# Patient Record
Sex: FEMALE | NOT HISPANIC OR LATINO | Employment: OTHER | URBAN - NONMETROPOLITAN AREA
[De-identification: names, ages, dates, MRNs, and addresses within clinical notes are randomized per-mention and may not be internally consistent; named-entity substitution may affect disease eponyms.]

---

## 2024-01-18 ENCOUNTER — OFFICE VISIT (OUTPATIENT)
Dept: PRIMARY CARE | Facility: CLINIC | Age: 69
End: 2024-01-18
Payer: MEDICARE

## 2024-01-18 ENCOUNTER — TELEPHONE (OUTPATIENT)
Dept: PRIMARY CARE | Facility: CLINIC | Age: 69
End: 2024-01-18

## 2024-01-18 VITALS
HEIGHT: 67 IN | OXYGEN SATURATION: 96 % | WEIGHT: 163.8 LBS | DIASTOLIC BLOOD PRESSURE: 86 MMHG | HEART RATE: 84 BPM | TEMPERATURE: 98.4 F | BODY MASS INDEX: 25.71 KG/M2 | SYSTOLIC BLOOD PRESSURE: 138 MMHG

## 2024-01-18 DIAGNOSIS — E78.2 MIXED HYPERLIPIDEMIA: ICD-10-CM

## 2024-01-18 DIAGNOSIS — Z00.00 ROUTINE GENERAL MEDICAL EXAMINATION AT HEALTH CARE FACILITY: Primary | ICD-10-CM

## 2024-01-18 DIAGNOSIS — K21.9 GASTROESOPHAGEAL REFLUX DISEASE WITHOUT ESOPHAGITIS: ICD-10-CM

## 2024-01-18 DIAGNOSIS — M54.12 CERVICAL RADICULOPATHY AT C5: ICD-10-CM

## 2024-01-18 DIAGNOSIS — R53.82 CHRONIC FATIGUE: ICD-10-CM

## 2024-01-18 PROBLEM — H10.45 CHRONIC ALLERGIC CONJUNCTIVITIS: Status: ACTIVE | Noted: 2024-01-18

## 2024-01-18 PROBLEM — E78.5 HYPERLIPIDEMIA: Status: ACTIVE | Noted: 2024-01-18

## 2024-01-18 PROBLEM — D18.01 HEMANGIOMA OF SKIN AND SUBCUTANEOUS TISSUE: Status: ACTIVE | Noted: 2022-11-16

## 2024-01-18 PROBLEM — K52.9 COLITIS: Status: ACTIVE | Noted: 2024-01-18

## 2024-01-18 PROBLEM — I73.9 INTERMITTENT CLAUDICATION (CMS-HCC): Status: ACTIVE | Noted: 2024-01-18

## 2024-01-18 PROBLEM — J30.1 ALLERGIC RHINITIS DUE TO POLLEN: Status: ACTIVE | Noted: 2024-01-18

## 2024-01-18 PROBLEM — R10.9 ABDOMINAL PAIN: Status: ACTIVE | Noted: 2024-01-18

## 2024-01-18 PROBLEM — N39.3 FEMALE STRESS INCONTINENCE: Status: ACTIVE | Noted: 2024-01-18

## 2024-01-18 PROBLEM — H05.012 ORBITAL CELLULITIS ON LEFT: Status: ACTIVE | Noted: 2024-01-18

## 2024-01-18 PROBLEM — J30.9 ALLERGIC RHINITIS: Status: ACTIVE | Noted: 2024-01-18

## 2024-01-18 PROBLEM — E83.40 DISORDER OF MAGNESIUM METABOLISM: Status: ACTIVE | Noted: 2024-01-18

## 2024-01-18 PROBLEM — J30.81 ALLERGIC RHINITIS DUE TO ANIMAL HAIR AND DANDER: Status: ACTIVE | Noted: 2024-01-18

## 2024-01-18 PROBLEM — R19.7 DIARRHEA: Status: ACTIVE | Noted: 2024-01-18

## 2024-01-18 PROBLEM — N20.0 NEPHROLITH: Status: ACTIVE | Noted: 2024-01-18

## 2024-01-18 PROBLEM — D22.30 ATYPICAL NEVUS OF FACE: Status: ACTIVE | Noted: 2022-11-16

## 2024-01-18 PROBLEM — D48.5 NEOPLASM OF UNCERTAIN BEHAVIOR OF SKIN: Status: ACTIVE | Noted: 2022-11-16

## 2024-01-18 PROBLEM — F41.9 ANXIETY: Status: ACTIVE | Noted: 2024-01-18

## 2024-01-18 PROBLEM — D17.1 LIPOMA OF FLANK: Status: ACTIVE | Noted: 2024-01-18

## 2024-01-18 PROBLEM — F41.1 GENERALIZED ANXIETY DISORDER: Status: ACTIVE | Noted: 2024-01-18

## 2024-01-18 PROBLEM — F17.200 NICOTINE DEPENDENCE: Status: ACTIVE | Noted: 2024-01-18

## 2024-01-18 LAB
ALBUMIN SERPL BCP-MCNC: 4.6 G/DL (ref 3.4–5)
ALP SERPL-CCNC: 73 U/L (ref 33–136)
ALT SERPL W P-5'-P-CCNC: 35 U/L (ref 7–45)
ANION GAP SERPL CALC-SCNC: 15 MMOL/L (ref 10–20)
AST SERPL W P-5'-P-CCNC: 30 U/L (ref 9–39)
BASOPHILS # BLD AUTO: 0.05 X10*3/UL (ref 0–0.1)
BASOPHILS NFR BLD AUTO: 0.5 %
BILIRUB SERPL-MCNC: 0.4 MG/DL (ref 0–1.2)
BUN SERPL-MCNC: 11 MG/DL (ref 6–23)
CALCIUM SERPL-MCNC: 9.6 MG/DL (ref 8.6–10.3)
CHLORIDE SERPL-SCNC: 103 MMOL/L (ref 98–107)
CHOLEST SERPL-MCNC: 172 MG/DL (ref 0–199)
CHOLESTEROL/HDL RATIO: 3.8
CO2 SERPL-SCNC: 27 MMOL/L (ref 21–32)
CREAT SERPL-MCNC: 0.72 MG/DL (ref 0.5–1.05)
EGFRCR SERPLBLD CKD-EPI 2021: >90 ML/MIN/1.73M*2
EOSINOPHIL # BLD AUTO: 0.29 X10*3/UL (ref 0–0.7)
EOSINOPHIL NFR BLD AUTO: 3 %
ERYTHROCYTE [DISTWIDTH] IN BLOOD BY AUTOMATED COUNT: 11.5 % (ref 11.5–14.5)
GLUCOSE SERPL-MCNC: 87 MG/DL (ref 74–99)
HCT VFR BLD AUTO: 48.5 % (ref 36–46)
HDLC SERPL-MCNC: 45.7 MG/DL
HGB BLD-MCNC: 16.6 G/DL (ref 12–16)
IMM GRANULOCYTES # BLD AUTO: 0.07 X10*3/UL (ref 0–0.7)
IMM GRANULOCYTES NFR BLD AUTO: 0.7 % (ref 0–0.9)
LDLC SERPL CALC-MCNC: 101 MG/DL
LYMPHOCYTES # BLD AUTO: 3.09 X10*3/UL (ref 1.2–4.8)
LYMPHOCYTES NFR BLD AUTO: 32.3 %
MCH RBC QN AUTO: 31.3 PG (ref 26–34)
MCHC RBC AUTO-ENTMCNC: 34.2 G/DL (ref 32–36)
MCV RBC AUTO: 92 FL (ref 80–100)
MONOCYTES # BLD AUTO: 0.65 X10*3/UL (ref 0.1–1)
MONOCYTES NFR BLD AUTO: 6.8 %
NEUTROPHILS # BLD AUTO: 5.41 X10*3/UL (ref 1.2–7.7)
NEUTROPHILS NFR BLD AUTO: 56.7 %
NON HDL CHOLESTEROL: 126 MG/DL (ref 0–149)
NRBC BLD-RTO: 0 /100 WBCS (ref 0–0)
PLATELET # BLD AUTO: 233 X10*3/UL (ref 150–450)
POTASSIUM SERPL-SCNC: 4.4 MMOL/L (ref 3.5–5.3)
PROT SERPL-MCNC: 6.8 G/DL (ref 6.4–8.2)
RBC # BLD AUTO: 5.3 X10*6/UL (ref 4–5.2)
SODIUM SERPL-SCNC: 141 MMOL/L (ref 136–145)
T4 FREE SERPL-MCNC: 0.87 NG/DL (ref 0.61–1.12)
TRIGL SERPL-MCNC: 129 MG/DL (ref 0–149)
TSH SERPL-ACNC: 3.11 MIU/L (ref 0.44–3.98)
VLDL: 26 MG/DL (ref 0–40)
WBC # BLD AUTO: 9.6 X10*3/UL (ref 4.4–11.3)

## 2024-01-18 PROCEDURE — 1159F MED LIST DOCD IN RCRD: CPT | Performed by: FAMILY MEDICINE

## 2024-01-18 PROCEDURE — 80053 COMPREHEN METABOLIC PANEL: CPT

## 2024-01-18 PROCEDURE — 1125F AMNT PAIN NOTED PAIN PRSNT: CPT | Performed by: FAMILY MEDICINE

## 2024-01-18 PROCEDURE — 84443 ASSAY THYROID STIM HORMONE: CPT

## 2024-01-18 PROCEDURE — 4004F PT TOBACCO SCREEN RCVD TLK: CPT | Performed by: FAMILY MEDICINE

## 2024-01-18 PROCEDURE — 80061 LIPID PANEL: CPT

## 2024-01-18 PROCEDURE — 84439 ASSAY OF FREE THYROXINE: CPT

## 2024-01-18 PROCEDURE — 36415 COLL VENOUS BLD VENIPUNCTURE: CPT

## 2024-01-18 PROCEDURE — 1170F FXNL STATUS ASSESSED: CPT | Performed by: FAMILY MEDICINE

## 2024-01-18 PROCEDURE — G0439 PPPS, SUBSEQ VISIT: HCPCS | Performed by: FAMILY MEDICINE

## 2024-01-18 PROCEDURE — 85025 COMPLETE CBC W/AUTO DIFF WBC: CPT

## 2024-01-18 RX ORDER — EPINASTINE HYDROCHLORIDE 0.5 MG/ML
SOLUTION/ DROPS OPHTHALMIC EVERY 12 HOURS
COMMUNITY
Start: 2018-08-02

## 2024-01-18 RX ORDER — OLOPATADINE HYDROCHLORIDE 2 MG/ML
SOLUTION/ DROPS OPHTHALMIC EVERY 24 HOURS
COMMUNITY
Start: 2021-07-26

## 2024-01-18 RX ORDER — ROSUVASTATIN CALCIUM 5 MG/1
5 TABLET, COATED ORAL DAILY
Qty: 90 TABLET | Refills: 3 | Status: SHIPPED | OUTPATIENT
Start: 2024-01-18 | End: 2024-04-18 | Stop reason: SDUPTHER

## 2024-01-18 RX ORDER — FLUOROURACIL 50 MG/G
CREAM TOPICAL
COMMUNITY
Start: 2018-05-14

## 2024-01-18 RX ORDER — ROSUVASTATIN CALCIUM 5 MG/1
5 TABLET, COATED ORAL DAILY
COMMUNITY
End: 2024-01-18 | Stop reason: SDUPTHER

## 2024-01-18 RX ORDER — ASPIRIN 81 MG/1
TABLET ORAL
COMMUNITY
Start: 2021-03-24

## 2024-01-18 RX ORDER — DULOXETIN HYDROCHLORIDE 30 MG/1
30 CAPSULE, DELAYED RELEASE ORAL DAILY
COMMUNITY
End: 2024-01-18

## 2024-01-18 RX ORDER — CALCIUM CARBONATE 500(1250)
TABLET ORAL EVERY 12 HOURS
COMMUNITY

## 2024-01-18 RX ORDER — EZETIMIBE 10 MG
10 TABLET ORAL DAILY
Qty: 90 TABLET | Refills: 3 | Status: SHIPPED | OUTPATIENT
Start: 2024-01-18 | End: 2025-01-17

## 2024-01-18 RX ORDER — ERGOCALCIFEROL 1.25 MG/1
CAPSULE ORAL
COMMUNITY

## 2024-01-18 RX ORDER — ALPRAZOLAM 0.5 MG/1
TABLET ORAL
COMMUNITY
Start: 2022-09-01

## 2024-01-18 RX ORDER — ALBUTEROL SULFATE 90 UG/1
AEROSOL, METERED RESPIRATORY (INHALATION) EVERY 6 HOURS
COMMUNITY
Start: 2018-08-02

## 2024-01-18 RX ORDER — DULOXETIN HYDROCHLORIDE 20 MG/1
40 CAPSULE, DELAYED RELEASE ORAL DAILY
Qty: 180 CAPSULE | Refills: 3 | Status: SHIPPED | OUTPATIENT
Start: 2024-01-18 | End: 2025-01-17

## 2024-01-18 RX ORDER — EZETIMIBE 10 MG
TABLET ORAL EVERY 24 HOURS
COMMUNITY
Start: 2017-02-20 | End: 2024-01-18 | Stop reason: SDUPTHER

## 2024-01-18 ASSESSMENT — ENCOUNTER SYMPTOMS
FREQUENCY: 0
DYSURIA: 0
LOSS OF SENSATION IN FEET: 0
NAUSEA: 0
FATIGUE: 0
NECK STIFFNESS: 1
FEVER: 0
BRUISES/BLEEDS EASILY: 0
CHILLS: 0
DIARRHEA: 0
NECK PAIN: 1
VOMITING: 0
HEMATURIA: 0
DIZZINESS: 0
ARTHRALGIAS: 1
NERVOUS/ANXIOUS: 0
COUGH: 0
WHEEZING: 0
HEADACHES: 0
JOINT SWELLING: 0
CONSTIPATION: 0
DYSPHORIC MOOD: 0
OCCASIONAL FEELINGS OF UNSTEADINESS: 0
PALPITATIONS: 0
ABDOMINAL PAIN: 0
SHORTNESS OF BREATH: 0
SORE THROAT: 0
DEPRESSION: 0

## 2024-01-18 ASSESSMENT — PAIN SCALES - GENERAL: PAINLEVEL: 2

## 2024-01-18 ASSESSMENT — ACTIVITIES OF DAILY LIVING (ADL)
MANAGING_FINANCES: INDEPENDENT
DRESSING: INDEPENDENT
GROCERY_SHOPPING: INDEPENDENT
TAKING_MEDICATION: INDEPENDENT
BATHING: INDEPENDENT

## 2024-01-18 ASSESSMENT — PATIENT HEALTH QUESTIONNAIRE - PHQ9
1. LITTLE INTEREST OR PLEASURE IN DOING THINGS: NOT AT ALL
SUM OF ALL RESPONSES TO PHQ9 QUESTIONS 1 AND 2: 0
2. FEELING DOWN, DEPRESSED OR HOPELESS: NOT AT ALL

## 2024-01-18 ASSESSMENT — LIFESTYLE VARIABLES: HOW OFTEN DO YOU HAVE A DRINK CONTAINING ALCOHOL: NEVER

## 2024-01-18 NOTE — PROGRESS NOTES
"Subjective   Reason for Visit: Lisa Tillman is an 68 y.o. female here for a Medicare Wellness visit.     Past Medical, Surgical, and Family History reviewed and updated in chart.    Reviewed all medications by prescribing practitioner or clinical pharmacist (such as prescriptions, OTCs, herbal therapies and supplements) and documented in the medical record.    HPI Here for yearly exam.  Care gaps reviewed.  Lab studies are due.  She complains on ongoing neck pain in her left side of her neck as well as down her left arm.  She does have a diagnosis of cervical radiculopathy and is wondering if she needs surgical intervention.  Her daughter recently had surgical intervention on her neck issues and she would like to see the same physician.    Patient Care Team:  Amanda Paris MD as PCP - General (Family Medicine)  Amanda Paris MD as PCP - [employer] PCP     Review of Systems   Constitutional:  Negative for chills, fatigue and fever.   HENT:  Negative for congestion, ear pain, hearing loss, postnasal drip, sore throat and tinnitus.    Eyes:  Negative for visual disturbance.   Respiratory:  Negative for cough, shortness of breath and wheezing.    Cardiovascular:  Negative for chest pain and palpitations.   Gastrointestinal:  Negative for abdominal pain, constipation, diarrhea, nausea and vomiting.   Endocrine: Negative for polyuria.   Genitourinary:  Negative for dysuria, frequency, hematuria and urgency.   Musculoskeletal:  Positive for arthralgias, neck pain and neck stiffness. Negative for gait problem and joint swelling.   Skin:  Negative for rash.   Neurological:  Negative for dizziness, syncope and headaches.   Hematological:  Does not bruise/bleed easily.   Psychiatric/Behavioral:  Negative for dysphoric mood. The patient is not nervous/anxious.        Objective   Vitals:  /86   Pulse 84   Temp 36.9 °C (98.4 °F)   Ht 1.702 m (5' 7\")   Wt 74.3 kg (163 lb " 12.8 oz)   SpO2 96%   BMI 25.65 kg/m²       Physical Exam  Vitals reviewed.   Constitutional:       General: She is not in acute distress.     Appearance: Normal appearance.   HENT:      Head: Normocephalic.      Right Ear: Tympanic membrane, ear canal and external ear normal.      Left Ear: Tympanic membrane, ear canal and external ear normal.      Nose: Nose normal.      Mouth/Throat:      Mouth: Mucous membranes are moist.      Pharynx: Oropharynx is clear.   Eyes:      Extraocular Movements: Extraocular movements intact.      Conjunctiva/sclera: Conjunctivae normal.      Pupils: Pupils are equal, round, and reactive to light.   Cardiovascular:      Rate and Rhythm: Normal rate and regular rhythm.      Pulses: Normal pulses.      Heart sounds: No murmur heard.  Pulmonary:      Effort: Pulmonary effort is normal.      Breath sounds: Normal breath sounds. No wheezing or rhonchi.   Abdominal:      General: Bowel sounds are normal. There is no distension.      Palpations: Abdomen is soft.      Tenderness: There is no abdominal tenderness. There is no rebound.   Musculoskeletal:         General: Swelling and tenderness present.      Right lower leg: No edema.      Left lower leg: No edema.   Skin:     General: Skin is warm.      Coloration: Skin is not jaundiced.   Neurological:      General: No focal deficit present.      Mental Status: She is alert and oriented to person, place, and time.      Cranial Nerves: No cranial nerve deficit.      Gait: Gait normal.   Psychiatric:         Mood and Affect: Mood normal.         Assessment/Plan   Problem List Items Addressed This Visit       Chronic fatigue    Relevant Medications    DULoxetine (Cymbalta) 20 mg DR capsule    Other Relevant Orders    CBC and Auto Differential    Thyroid Stimulating Hormone    Thyroxine, Free    Gastroesophageal reflux disease without esophagitis    Relevant Orders    CBC and Auto Differential    Hyperlipidemia    Relevant Medications    Zetia  10 mg tablet    rosuvastatin (Crestor) 5 mg tablet    Other Relevant Orders    Comprehensive Metabolic Panel    Lipid Panel     Other Visit Diagnoses       Routine general medical examination at health care facility    -  Primary    Relevant Orders    1 Year Follow Up In Primary Care - Wellness Exam    Cervical radiculopathy at C5        Relevant Orders    Referral to Orthopaedic Surgery          Reviewed low-fat low-cholesterol low-salt diet choices as well as encouraged regular physical activity for 30 minutes 4 times a week.  Mammogram due this spring. Colonoscopy up-to-date.  Immunizations reviewed. Revewed age-appropriate screening recommendations.  Laboratory studies ordered. Declines dexa.     Increase cymbalta to 40 mg a day and refer to ortho for neck

## 2024-02-01 ENCOUNTER — APPOINTMENT (OUTPATIENT)
Dept: ORTHOPEDIC SURGERY | Facility: CLINIC | Age: 69
End: 2024-02-01
Payer: MEDICARE

## 2024-02-06 ENCOUNTER — OFFICE VISIT (OUTPATIENT)
Dept: ORTHOPEDIC SURGERY | Facility: CLINIC | Age: 69
End: 2024-02-06
Payer: MEDICARE

## 2024-02-06 ENCOUNTER — HOSPITAL ENCOUNTER (OUTPATIENT)
Dept: RADIOLOGY | Facility: CLINIC | Age: 69
Discharge: HOME | End: 2024-02-06
Payer: MEDICARE

## 2024-02-06 DIAGNOSIS — M54.12 CERVICAL RADICULOPATHY: ICD-10-CM

## 2024-02-06 DIAGNOSIS — M54.12 CERVICAL RADICULOPATHY AT C5: ICD-10-CM

## 2024-02-06 DIAGNOSIS — M54.12 CERVICAL RADICULOPATHY: Primary | ICD-10-CM

## 2024-02-06 PROCEDURE — 4004F PT TOBACCO SCREEN RCVD TLK: CPT | Performed by: PHYSICIAN ASSISTANT

## 2024-02-06 PROCEDURE — 72050 X-RAY EXAM NECK SPINE 4/5VWS: CPT

## 2024-02-06 PROCEDURE — 1159F MED LIST DOCD IN RCRD: CPT | Performed by: PHYSICIAN ASSISTANT

## 2024-02-06 PROCEDURE — 1125F AMNT PAIN NOTED PAIN PRSNT: CPT | Performed by: PHYSICIAN ASSISTANT

## 2024-02-06 PROCEDURE — 99204 OFFICE O/P NEW MOD 45 MIN: CPT | Performed by: PHYSICIAN ASSISTANT

## 2024-02-06 PROCEDURE — 72050 X-RAY EXAM NECK SPINE 4/5VWS: CPT | Performed by: RADIOLOGY

## 2024-02-06 PROCEDURE — 1160F RVW MEDS BY RX/DR IN RCRD: CPT | Performed by: PHYSICIAN ASSISTANT

## 2024-02-06 NOTE — PROGRESS NOTES
Karissa is a 68-year-old female reporting clinic today for evaluation of her chronic neck pain with bilateral radiculopathy.    She was referred to see us by her daughter Gail Jensen, she recently had surgery with Dr. Coyne.    She has had symptoms for the last 20+ years, she attributes this to seeing a chiropractor around this time. She has midline neck pain with pain radiating down her arms bilaterally, left worse than right.  She has numbness and tingling in her arms bilaterally.  She states her head feels heavy and she spends a lot of time lying down.  She has difficulty sleeping at night, she can only sleep in 2 comfortable positions.  She has noticed a steady decline in her balance over the years.  She cannot drive longer than 45 minutes due to the neck pain.    She has done extensive conservative treatment over the years.  She has tried traction, TENS units, and several rounds of physical therapy.  She has been doing at home exercises consistently for the last 6 months.  She takes Aleve daily.  Conservative treatments have not made a difference in her symptoms.    Family, social, and medical histories are obtained and reviewed.  Current smoker, 1 pack/day.    ROS: All other systems have been reviewed and are negative except as previously noted in history of present illness.    Physical Exam:  Const: Well-appearing, well-nourished female in no distress.  Eyes: Normal appearing sclera and conjunctiva, no jaundice, pupils normal in appearance.  Neck: No masses or lymphadenopathy appreciated.  Resp: breathing comfortably, normal respiratory rate rate.  CV: No upper or lower extremity edema.  Musculoskeletal: Normal gait.  Able to heel/toe walk without difficulty.  Cervical ROM is supple.  Strength exam of the upper extremities reveals 5/5 strength in all major muscle groups.  No intrinsic wasting.  Negative Spurling sign.    Neuro: Sensation is intact and equal bilaterally. Deep tendon reflexes are normal and  symmetric.  No clonus, negative Mcdaniel sign, negative Lhermitte's sign  Skin: Intact without any lesions, normal turgor.  Psych: Alert and oriented x3, normal mood and affect.    I personally reviewed x-rays of the cervical spine completed today.  There is no evidence of acute fracture or instability.  Mild to moderate degenerative changes throughout the cervical spine.    The plan is to further evaluate her chronic neck pain with bilateral radiculopathy that is failed to improve with extensive conservative treatment.  A MRI of the cervical spine was completed today.  I discussed the importance of smoking cessation with her and recommended she reach out to her PCP for supportive measures and alternatives.  She will follow-up with me once her MRI is complete to discuss further treatment options, possibly including surgery.  She is not interested in injections.    **This note was dictated using speech recognition software and was not corrected for spelling or grammatical errors**

## 2024-02-22 ENCOUNTER — HOSPITAL ENCOUNTER (OUTPATIENT)
Dept: RADIOLOGY | Facility: HOSPITAL | Age: 69
Discharge: HOME | End: 2024-02-22
Payer: MEDICARE

## 2024-02-22 DIAGNOSIS — M54.12 CERVICAL RADICULOPATHY: ICD-10-CM

## 2024-02-22 PROCEDURE — 72141 MRI NECK SPINE W/O DYE: CPT

## 2024-02-22 PROCEDURE — 72141 MRI NECK SPINE W/O DYE: CPT | Performed by: RADIOLOGY

## 2024-03-07 ENCOUNTER — APPOINTMENT (OUTPATIENT)
Dept: ORTHOPEDIC SURGERY | Facility: CLINIC | Age: 69
End: 2024-03-07
Payer: MEDICARE

## 2024-03-21 ENCOUNTER — APPOINTMENT (OUTPATIENT)
Dept: ORTHOPEDIC SURGERY | Facility: CLINIC | Age: 69
End: 2024-03-21
Payer: MEDICARE

## 2024-04-04 ENCOUNTER — APPOINTMENT (OUTPATIENT)
Dept: ORTHOPEDIC SURGERY | Facility: CLINIC | Age: 69
End: 2024-04-04
Payer: MEDICARE

## 2024-04-15 ENCOUNTER — OFFICE VISIT (OUTPATIENT)
Dept: ORTHOPEDIC SURGERY | Facility: CLINIC | Age: 69
End: 2024-04-15
Payer: MEDICARE

## 2024-04-15 DIAGNOSIS — M54.12 CERVICAL RADICULITIS: ICD-10-CM

## 2024-04-15 DIAGNOSIS — M54.2 NECK PAIN: Primary | ICD-10-CM

## 2024-04-15 PROCEDURE — 99214 OFFICE O/P EST MOD 30 MIN: CPT | Performed by: ORTHOPAEDIC SURGERY

## 2024-04-15 PROCEDURE — 1160F RVW MEDS BY RX/DR IN RCRD: CPT | Performed by: ORTHOPAEDIC SURGERY

## 2024-04-15 PROCEDURE — 1159F MED LIST DOCD IN RCRD: CPT | Performed by: ORTHOPAEDIC SURGERY

## 2024-04-15 ASSESSMENT — PAIN SCALES - GENERAL: PAINLEVEL_OUTOF10: 0 - NO PAIN

## 2024-04-15 ASSESSMENT — PAIN - FUNCTIONAL ASSESSMENT: PAIN_FUNCTIONAL_ASSESSMENT: 0-10

## 2024-04-15 ASSESSMENT — PAIN DESCRIPTION - DESCRIPTORS: DESCRIPTORS: ACHING;TIRING

## 2024-04-15 NOTE — LETTER
April 15, 2024     Amanda Paris MD  [id number] [address] [address]    Patient: Karissa Tillman   YOB: 1955   Date of Visit: 4/15/2024       Dear Dr. Amanda Paris MD:    Thank you for referring Karissa Tillman to me for evaluation. Below are my notes for this consultation.  If you have questions, please do not hesitate to call me. I look forward to following your patient along with you.       Sincerely,     Pelon Coyne MD      CC: No Recipients  ______________________________________________________________________________________    68-year-old female chief complaint of neck pain.  Her head feels heavy.  She gets some pain in her arms intermittently but the neck pain is much worse.  She denies any bowel or bladder incontinence or other symptoms of myelopathy.  She has had some physical therapy.  She is here today to review an MRI.    She does have a history of smoking.  She quit cigarettes but uses nicotine patches regularly.    She is otherwise in fairly good health.    On exam she is well-appearing and in no distress.  She walks with a normal gait.  No focal neurologic deficits.  No pathologic reflexes or long track signs.    I personally reviewed MRI of her cervical spine.  This shows mild flattening of the spinal cord at C4-5 and C5-6 without any signal change.  There is bilateral foraminal stenosis at both of these levels.    68-year-old female with axial neck pain, minimal radicular symptoms, and nicotine use.  I recommended against surgery at this point as she does not have any moderate or severe myelopathy.  I strongly encouraged nicotine cessation as well as aerobic exercise.    I explained that surgery is intended to improve her radicular pain and if that is not the primary problem I would hold off on any spine surgery.  If she begins to have worsening fine motor disruption or gait instability, or new onset pain or numbness in the arms that  is more severe I would be happy to see her back to discuss surgery.    *This note was dictated using speech recognition software and was not corrected for spelling or grammatical errors*

## 2024-04-15 NOTE — PROGRESS NOTES
68-year-old female chief complaint of neck pain.  Her head feels heavy.  She gets some pain in her arms intermittently but the neck pain is much worse.  She denies any bowel or bladder incontinence or other symptoms of myelopathy.  She has had some physical therapy.  She is here today to review an MRI.    She does have a history of smoking.  She quit cigarettes but uses nicotine patches regularly.    She is otherwise in fairly good health.    On exam she is well-appearing and in no distress.  She walks with a normal gait.  No focal neurologic deficits.  No pathologic reflexes or long track signs.    I personally reviewed MRI of her cervical spine.  This shows mild flattening of the spinal cord at C4-5 and C5-6 without any signal change.  There is bilateral foraminal stenosis at both of these levels.    68-year-old female with axial neck pain, minimal radicular symptoms, and nicotine use.  I recommended against surgery at this point as she does not have any moderate or severe myelopathy.  I strongly encouraged nicotine cessation as well as aerobic exercise.    I explained that surgery is intended to improve her radicular pain and if that is not the primary problem I would hold off on any spine surgery.  If she begins to have worsening fine motor disruption or gait instability, or new onset pain or numbness in the arms that is more severe I would be happy to see her back to discuss surgery.    *This note was dictated using speech recognition software and was not corrected for spelling or grammatical errors*

## 2024-04-18 ENCOUNTER — APPOINTMENT (OUTPATIENT)
Dept: ORTHOPEDIC SURGERY | Facility: CLINIC | Age: 69
End: 2024-04-18
Payer: MEDICARE

## 2024-04-18 DIAGNOSIS — E78.2 MIXED HYPERLIPIDEMIA: ICD-10-CM

## 2024-04-18 PROBLEM — J32.9 SINUSITIS: Status: ACTIVE | Noted: 2024-04-18

## 2024-04-18 PROBLEM — L85.3 XEROSIS CUTIS: Status: ACTIVE | Noted: 2022-11-16

## 2024-04-18 PROBLEM — L57.0 ACTINIC KERATOSIS: Status: ACTIVE | Noted: 2022-11-16

## 2024-04-18 PROBLEM — R91.1 SOLITARY PULMONARY NODULE: Status: ACTIVE | Noted: 2024-04-18

## 2024-04-18 PROBLEM — M81.0 AGE RELATED OSTEOPOROSIS: Status: ACTIVE | Noted: 2024-04-18

## 2024-04-18 PROBLEM — E55.9 VITAMIN D DEFICIENCY: Status: ACTIVE | Noted: 2024-04-18

## 2024-04-18 PROBLEM — R10.31 RLQ ABDOMINAL PAIN: Status: ACTIVE | Noted: 2024-04-18

## 2024-04-18 PROBLEM — Z85.828 PERSONAL HISTORY OF OTHER MALIGNANT NEOPLASM OF SKIN: Status: ACTIVE | Noted: 2022-11-16

## 2024-04-18 PROBLEM — L21.9 SEBORRHEIC DERMATITIS, UNSPECIFIED: Status: ACTIVE | Noted: 2018-05-14

## 2024-04-18 PROBLEM — M54.12 CERVICAL RADICULOPATHY: Status: ACTIVE | Noted: 2024-04-18

## 2024-04-18 PROBLEM — L71.1 RHINOPHYMA: Status: ACTIVE | Noted: 2021-11-28

## 2024-04-18 PROBLEM — C44.622 SQUAMOUS CELL CARCINOMA OF SKIN OF RIGHT UPPER LIMB, INCLUDING SHOULDER: Status: ACTIVE | Noted: 2022-11-16

## 2024-04-18 PROBLEM — L81.4 OTHER MELANIN HYPERPIGMENTATION: Status: ACTIVE | Noted: 2021-02-17

## 2024-04-18 PROBLEM — L90.5 SCAR CONDITION AND FIBROSIS OF SKIN: Status: ACTIVE | Noted: 2021-02-17

## 2024-04-18 RX ORDER — ROSUVASTATIN CALCIUM 5 MG/1
5 TABLET, COATED ORAL DAILY
Qty: 90 TABLET | Refills: 1 | Status: SHIPPED | OUTPATIENT
Start: 2024-04-18 | End: 2024-10-15

## 2024-07-08 ENCOUNTER — APPOINTMENT (OUTPATIENT)
Dept: DERMATOLOGY | Facility: CLINIC | Age: 69
End: 2024-07-08
Payer: MEDICARE

## 2024-07-15 ENCOUNTER — APPOINTMENT (OUTPATIENT)
Dept: PRIMARY CARE | Facility: CLINIC | Age: 69
End: 2024-07-15
Payer: MEDICARE

## 2024-07-15 VITALS
HEART RATE: 62 BPM | HEIGHT: 67 IN | OXYGEN SATURATION: 97 % | WEIGHT: 157.2 LBS | DIASTOLIC BLOOD PRESSURE: 88 MMHG | TEMPERATURE: 97.6 F | SYSTOLIC BLOOD PRESSURE: 138 MMHG | BODY MASS INDEX: 24.67 KG/M2

## 2024-07-15 DIAGNOSIS — E78.2 MIXED HYPERLIPIDEMIA: Primary | ICD-10-CM

## 2024-07-15 DIAGNOSIS — K21.9 GASTROESOPHAGEAL REFLUX DISEASE WITHOUT ESOPHAGITIS: ICD-10-CM

## 2024-07-15 DIAGNOSIS — R53.82 CHRONIC FATIGUE: ICD-10-CM

## 2024-07-15 DIAGNOSIS — R03.0 ELEVATED BLOOD PRESSURE READING: ICD-10-CM

## 2024-07-15 DIAGNOSIS — Z12.31 BREAST CANCER SCREENING BY MAMMOGRAM: ICD-10-CM

## 2024-07-15 DIAGNOSIS — Z72.0 TOBACCO CONSUMPTION: ICD-10-CM

## 2024-07-15 DIAGNOSIS — R73.9 HYPERGLYCEMIA: ICD-10-CM

## 2024-07-15 LAB
ALBUMIN SERPL BCP-MCNC: 4.7 G/DL (ref 3.4–5)
ALP SERPL-CCNC: 81 U/L (ref 33–136)
ALT SERPL W P-5'-P-CCNC: 26 U/L (ref 7–45)
ANION GAP SERPL CALC-SCNC: 14 MMOL/L (ref 10–20)
AST SERPL W P-5'-P-CCNC: 31 U/L (ref 9–39)
BILIRUB SERPL-MCNC: 0.4 MG/DL (ref 0–1.2)
BUN SERPL-MCNC: 10 MG/DL (ref 6–23)
CALCIUM SERPL-MCNC: 9.7 MG/DL (ref 8.6–10.3)
CHLORIDE SERPL-SCNC: 102 MMOL/L (ref 98–107)
CHOLEST SERPL-MCNC: 169 MG/DL (ref 0–199)
CHOLESTEROL/HDL RATIO: 3.5
CO2 SERPL-SCNC: 30 MMOL/L (ref 21–32)
CREAT SERPL-MCNC: 0.71 MG/DL (ref 0.5–1.05)
EGFRCR SERPLBLD CKD-EPI 2021: >90 ML/MIN/1.73M*2
GLUCOSE SERPL-MCNC: 94 MG/DL (ref 74–99)
HDLC SERPL-MCNC: 48.2 MG/DL
LDLC SERPL CALC-MCNC: 95 MG/DL
NON HDL CHOLESTEROL: 121 MG/DL (ref 0–149)
POTASSIUM SERPL-SCNC: 4.6 MMOL/L (ref 3.5–5.3)
PROT SERPL-MCNC: 7 G/DL (ref 6.4–8.2)
SODIUM SERPL-SCNC: 141 MMOL/L (ref 136–145)
TRIGL SERPL-MCNC: 128 MG/DL (ref 0–149)
VLDL: 26 MG/DL (ref 0–40)

## 2024-07-15 PROCEDURE — 80053 COMPREHEN METABOLIC PANEL: CPT

## 2024-07-15 PROCEDURE — 36415 COLL VENOUS BLD VENIPUNCTURE: CPT

## 2024-07-15 PROCEDURE — 83036 HEMOGLOBIN GLYCOSYLATED A1C: CPT

## 2024-07-15 PROCEDURE — 80061 LIPID PANEL: CPT

## 2024-07-15 PROCEDURE — 4004F PT TOBACCO SCREEN RCVD TLK: CPT | Performed by: FAMILY MEDICINE

## 2024-07-15 PROCEDURE — 1124F ACP DISCUSS-NO DSCNMKR DOCD: CPT | Performed by: FAMILY MEDICINE

## 2024-07-15 PROCEDURE — 99214 OFFICE O/P EST MOD 30 MIN: CPT | Performed by: FAMILY MEDICINE

## 2024-07-15 PROCEDURE — 1126F AMNT PAIN NOTED NONE PRSNT: CPT | Performed by: FAMILY MEDICINE

## 2024-07-15 PROCEDURE — 1159F MED LIST DOCD IN RCRD: CPT | Performed by: FAMILY MEDICINE

## 2024-07-15 RX ORDER — ROSUVASTATIN CALCIUM 5 MG/1
5 TABLET, COATED ORAL DAILY
Qty: 90 TABLET | Refills: 3 | Status: SHIPPED | OUTPATIENT
Start: 2024-07-15 | End: 2025-07-15

## 2024-07-15 RX ORDER — DULOXETIN HYDROCHLORIDE 20 MG/1
40 CAPSULE, DELAYED RELEASE ORAL DAILY
Qty: 180 CAPSULE | Refills: 3 | Status: SHIPPED | OUTPATIENT
Start: 2024-07-15 | End: 2025-07-15

## 2024-07-15 RX ORDER — EZETIMIBE 10 MG/1
10 TABLET ORAL DAILY
Qty: 90 TABLET | Refills: 3 | Status: SHIPPED | OUTPATIENT
Start: 2024-07-15 | End: 2025-07-15

## 2024-07-15 RX ORDER — NALTREXONE HYDROCHLORIDE 50 MG/1
50 TABLET, FILM COATED ORAL DAILY
Qty: 30 TABLET | Refills: 11 | Status: SHIPPED | OUTPATIENT
Start: 2024-07-15 | End: 2025-07-15

## 2024-07-15 ASSESSMENT — ENCOUNTER SYMPTOMS
NAUSEA: 0
FREQUENCY: 0
HEADACHES: 0
DIZZINESS: 0
CHILLS: 0
ARTHRALGIAS: 1
NECK STIFFNESS: 1
FATIGUE: 0
VOMITING: 0
COUGH: 0
SHORTNESS OF BREATH: 0
WHEEZING: 0
BRUISES/BLEEDS EASILY: 0
DIARRHEA: 0
CONSTIPATION: 0
SORE THROAT: 0
NERVOUS/ANXIOUS: 0
HEMATURIA: 0
DYSPHORIC MOOD: 0
JOINT SWELLING: 0
FEVER: 0
PALPITATIONS: 0
NECK PAIN: 1
ABDOMINAL PAIN: 0
DYSURIA: 0

## 2024-07-15 ASSESSMENT — PATIENT HEALTH QUESTIONNAIRE - PHQ9
SUM OF ALL RESPONSES TO PHQ9 QUESTIONS 1 AND 2: 0
2. FEELING DOWN, DEPRESSED OR HOPELESS: NOT AT ALL
1. LITTLE INTEREST OR PLEASURE IN DOING THINGS: NOT AT ALL

## 2024-07-15 ASSESSMENT — PAIN SCALES - GENERAL: PAINLEVEL: 0-NO PAIN

## 2024-07-15 ASSESSMENT — LIFESTYLE VARIABLES: HOW OFTEN DO YOU HAVE A DRINK CONTAINING ALCOHOL: NEVER

## 2024-07-15 NOTE — PROGRESS NOTES
"Subjective   Patient ID: Karissa Tillman is a 68 y.o. female who presents for Follow-up.    HPI doing well overall. Moving more. Lost 6 lbs. Checks bp at home. No new concerns.     Review of Systems   Constitutional:  Negative for chills, fatigue and fever.   HENT:  Negative for congestion, ear pain, hearing loss, postnasal drip, sore throat and tinnitus.    Eyes:  Negative for visual disturbance.   Respiratory:  Negative for cough, shortness of breath and wheezing.    Cardiovascular:  Negative for chest pain and palpitations.   Gastrointestinal:  Negative for abdominal pain, constipation, diarrhea, nausea and vomiting.   Endocrine: Negative for polyuria.   Genitourinary:  Negative for dysuria, frequency, hematuria and urgency.   Musculoskeletal:  Positive for arthralgias, neck pain and neck stiffness. Negative for gait problem and joint swelling.   Skin:  Negative for rash.   Neurological:  Negative for dizziness, syncope and headaches.   Hematological:  Does not bruise/bleed easily.   Psychiatric/Behavioral:  Negative for dysphoric mood. The patient is not nervous/anxious.        Objective   /88   Pulse 62   Temp 36.4 °C (97.6 °F)   Ht 1.702 m (5' 7\")   Wt 71.3 kg (157 lb 3.2 oz)   LMP  (LMP Unknown)   SpO2 97%   BMI 24.62 kg/m²     Physical Exam  Vitals reviewed.   Constitutional:       General: She is not in acute distress.     Appearance: Normal appearance.   HENT:      Head: Normocephalic.      Right Ear: Tympanic membrane, ear canal and external ear normal.      Left Ear: Tympanic membrane, ear canal and external ear normal.      Nose: Nose normal.      Mouth/Throat:      Mouth: Mucous membranes are moist.      Pharynx: Oropharynx is clear.   Eyes:      Extraocular Movements: Extraocular movements intact.      Conjunctiva/sclera: Conjunctivae normal.      Pupils: Pupils are equal, round, and reactive to light.   Cardiovascular:      Rate and Rhythm: Normal rate and regular rhythm.      Pulses: " Normal pulses.      Heart sounds: No murmur heard.  Pulmonary:      Effort: Pulmonary effort is normal.      Breath sounds: Normal breath sounds. No wheezing or rhonchi.   Abdominal:      General: Bowel sounds are normal. There is no distension.      Palpations: Abdomen is soft.      Tenderness: There is no abdominal tenderness. There is no rebound.   Musculoskeletal:         General: No swelling or tenderness.      Right lower leg: No edema.      Left lower leg: No edema.   Skin:     General: Skin is warm.      Coloration: Skin is not jaundiced.   Neurological:      General: No focal deficit present.      Mental Status: She is alert and oriented to person, place, and time.      Cranial Nerves: No cranial nerve deficit.      Gait: Gait normal.   Psychiatric:         Mood and Affect: Mood normal.         Assessment/Plan   Problem List Items Addressed This Visit             ICD-10-CM    Chronic fatigue R53.82    Relevant Medications    DULoxetine (Cymbalta) 20 mg DR capsule    Gastroesophageal reflux disease without esophagitis K21.9    Hyperlipidemia - Primary E78.5    Relevant Medications    ezetimibe (Zetia) 10 mg tablet    rosuvastatin (Crestor) 5 mg tablet    Other Relevant Orders    Comprehensive Metabolic Panel    Lipid Panel     Other Visit Diagnoses         Codes    Breast cancer screening by mammogram     Z12.31    Relevant Orders    BI mammo bilateral screening tomosynthesis    Elevated blood pressure reading     R03.0    Tobacco consumption     Z72.0    Relevant Medications    naltrexone (Depade) 50 mg tablet    Hyperglycemia     R73.9    Relevant Orders    Hemoglobin A1C          Try naltrexone for curbing cravings to help with weight loss and smoking cessation and ultimately lower blood pressure as well as cholesterol and sugar.

## 2024-07-16 LAB
EST. AVERAGE GLUCOSE BLD GHB EST-MCNC: 134 MG/DL
HBA1C MFR BLD: 6.3 %

## 2024-07-30 ENCOUNTER — HOSPITAL ENCOUNTER (OUTPATIENT)
Dept: RADIOLOGY | Facility: HOSPITAL | Age: 69
Discharge: HOME | End: 2024-07-30
Payer: MEDICARE

## 2024-07-30 VITALS — WEIGHT: 155 LBS | BODY MASS INDEX: 24.33 KG/M2 | HEIGHT: 67 IN

## 2024-07-30 DIAGNOSIS — Z12.31 BREAST CANCER SCREENING BY MAMMOGRAM: ICD-10-CM

## 2024-07-30 PROCEDURE — 77067 SCR MAMMO BI INCL CAD: CPT | Performed by: RADIOLOGY

## 2024-07-30 PROCEDURE — 77067 SCR MAMMO BI INCL CAD: CPT

## 2024-07-30 PROCEDURE — 77063 BREAST TOMOSYNTHESIS BI: CPT | Performed by: RADIOLOGY

## 2025-01-30 ENCOUNTER — APPOINTMENT (OUTPATIENT)
Dept: PRIMARY CARE | Facility: CLINIC | Age: 70
End: 2025-01-30
Payer: MEDICARE

## 2025-01-30 VITALS
DIASTOLIC BLOOD PRESSURE: 88 MMHG | HEIGHT: 67 IN | HEART RATE: 79 BPM | OXYGEN SATURATION: 97 % | TEMPERATURE: 97.7 F | BODY MASS INDEX: 24.27 KG/M2 | WEIGHT: 154.6 LBS | SYSTOLIC BLOOD PRESSURE: 138 MMHG

## 2025-01-30 DIAGNOSIS — K21.9 GASTROESOPHAGEAL REFLUX DISEASE WITHOUT ESOPHAGITIS: ICD-10-CM

## 2025-01-30 DIAGNOSIS — R05.3 CHRONIC COUGH: ICD-10-CM

## 2025-01-30 DIAGNOSIS — R53.82 CHRONIC FATIGUE: ICD-10-CM

## 2025-01-30 DIAGNOSIS — E78.2 MIXED HYPERLIPIDEMIA: ICD-10-CM

## 2025-01-30 DIAGNOSIS — F41.9 ANXIETY: ICD-10-CM

## 2025-01-30 DIAGNOSIS — Z00.00 ROUTINE GENERAL MEDICAL EXAMINATION AT HEALTH CARE FACILITY: Primary | ICD-10-CM

## 2025-01-30 PROCEDURE — 1126F AMNT PAIN NOTED NONE PRSNT: CPT | Performed by: FAMILY MEDICINE

## 2025-01-30 PROCEDURE — 4004F PT TOBACCO SCREEN RCVD TLK: CPT | Performed by: FAMILY MEDICINE

## 2025-01-30 PROCEDURE — G0439 PPPS, SUBSEQ VISIT: HCPCS | Performed by: FAMILY MEDICINE

## 2025-01-30 PROCEDURE — 1159F MED LIST DOCD IN RCRD: CPT | Performed by: FAMILY MEDICINE

## 2025-01-30 PROCEDURE — 3008F BODY MASS INDEX DOCD: CPT | Performed by: FAMILY MEDICINE

## 2025-01-30 PROCEDURE — 1170F FXNL STATUS ASSESSED: CPT | Performed by: FAMILY MEDICINE

## 2025-01-30 PROCEDURE — 1124F ACP DISCUSS-NO DSCNMKR DOCD: CPT | Performed by: FAMILY MEDICINE

## 2025-01-30 RX ORDER — ROSUVASTATIN CALCIUM 5 MG/1
5 TABLET, COATED ORAL DAILY
Qty: 90 TABLET | Refills: 3 | Status: SHIPPED | OUTPATIENT
Start: 2025-01-30 | End: 2026-01-30

## 2025-01-30 RX ORDER — ALPRAZOLAM 0.5 MG/1
0.5 TABLET ORAL NIGHTLY PRN
Qty: 30 TABLET | Refills: 1 | Status: SHIPPED | OUTPATIENT
Start: 2025-01-30 | End: 2025-03-31

## 2025-01-30 RX ORDER — DULOXETIN HYDROCHLORIDE 20 MG/1
40 CAPSULE, DELAYED RELEASE ORAL DAILY
Qty: 180 CAPSULE | Refills: 3 | Status: SHIPPED | OUTPATIENT
Start: 2025-01-30 | End: 2026-01-30

## 2025-01-30 RX ORDER — EZETIMIBE 10 MG/1
10 TABLET ORAL DAILY
Qty: 90 TABLET | Refills: 3 | Status: SHIPPED | OUTPATIENT
Start: 2025-01-30 | End: 2026-01-30

## 2025-01-30 ASSESSMENT — ACTIVITIES OF DAILY LIVING (ADL)
DOING_HOUSEWORK: INDEPENDENT
BATHING: INDEPENDENT
DRESSING: INDEPENDENT
MANAGING_FINANCES: INDEPENDENT
TAKING_MEDICATION: INDEPENDENT
GROCERY_SHOPPING: INDEPENDENT

## 2025-01-30 ASSESSMENT — ENCOUNTER SYMPTOMS
VOMITING: 0
CONSTIPATION: 0
JOINT SWELLING: 0
COUGH: 1
CHILLS: 0
PALPITATIONS: 0
BRUISES/BLEEDS EASILY: 0
FEVER: 0
FREQUENCY: 0
DYSPHORIC MOOD: 0
HEMATURIA: 0
ABDOMINAL PAIN: 0
SHORTNESS OF BREATH: 0
WHEEZING: 0
NAUSEA: 0
HEADACHES: 0
FATIGUE: 0
DYSURIA: 0
ARTHRALGIAS: 0
NERVOUS/ANXIOUS: 0
DIARRHEA: 0
SORE THROAT: 0
DIZZINESS: 0
TROUBLE SWALLOWING: 1

## 2025-01-30 ASSESSMENT — PATIENT HEALTH QUESTIONNAIRE - PHQ9
2. FEELING DOWN, DEPRESSED OR HOPELESS: NOT AT ALL
SUM OF ALL RESPONSES TO PHQ9 QUESTIONS 1 AND 2: 0
1. LITTLE INTEREST OR PLEASURE IN DOING THINGS: NOT AT ALL

## 2025-01-30 ASSESSMENT — PAIN SCALES - GENERAL: PAINLEVEL_OUTOF10: 0-NO PAIN

## 2025-01-30 ASSESSMENT — LIFESTYLE VARIABLES: HOW OFTEN DO YOU HAVE A DRINK CONTAINING ALCOHOL: NEVER

## 2025-01-30 NOTE — PROGRESS NOTES
"Subjective   Reason for Visit: Lisa Tillman is an 69 y.o. female here for a Medicare Wellness visit.     Past Medical, Surgical, and Family History reviewed and updated in chart.    Reviewed all medications by prescribing practitioner or clinical pharmacist (such as prescriptions, OTCs, herbal therapies and supplements) and documented in the medical record.    HPI here for annual exam, due for bone density and declines pneumovax.   Using nexium almost daily.   Patient Care Team:  Amanda Paris MD as PCP - General (Family Medicine)  Amanda Paris MD as PCP - [employer] PCP     Review of Systems   Constitutional:  Negative for chills, fatigue and fever.   HENT:  Positive for trouble swallowing. Negative for congestion, ear pain, hearing loss, postnasal drip, sore throat and tinnitus.    Eyes:  Negative for visual disturbance.   Respiratory:  Positive for cough. Negative for shortness of breath and wheezing.    Cardiovascular:  Negative for chest pain and palpitations.   Gastrointestinal:  Negative for abdominal pain, constipation, diarrhea, nausea and vomiting.   Endocrine: Negative for polyuria.   Genitourinary:  Negative for dysuria, frequency, hematuria and urgency.   Musculoskeletal:  Negative for arthralgias, gait problem and joint swelling.   Skin:  Negative for rash.   Neurological:  Negative for dizziness, syncope and headaches.   Hematological:  Does not bruise/bleed easily.   Psychiatric/Behavioral:  Negative for dysphoric mood. The patient is not nervous/anxious.        Objective   Vitals:  /88   Pulse 79   Temp 36.5 °C (97.7 °F)   Ht 1.702 m (5' 7\")   Wt 70.1 kg (154 lb 9.6 oz)   LMP  (LMP Unknown)   SpO2 97%   BMI 24.21 kg/m²       Physical Exam  Vitals reviewed.   Constitutional:       General: She is not in acute distress.     Appearance: Normal appearance.   HENT:      Head: Normocephalic.      Right Ear: Tympanic membrane, ear canal and " external ear normal.      Left Ear: Tympanic membrane, ear canal and external ear normal.      Nose: Nose normal.      Mouth/Throat:      Mouth: Mucous membranes are moist.      Pharynx: Oropharynx is clear.   Eyes:      Extraocular Movements: Extraocular movements intact.      Conjunctiva/sclera: Conjunctivae normal.      Pupils: Pupils are equal, round, and reactive to light.   Cardiovascular:      Rate and Rhythm: Normal rate and regular rhythm.      Pulses: Normal pulses.      Heart sounds: No murmur heard.  Pulmonary:      Effort: Pulmonary effort is normal.      Breath sounds: Normal breath sounds. No wheezing or rhonchi.   Abdominal:      General: Bowel sounds are normal. There is no distension.      Palpations: Abdomen is soft.      Tenderness: There is no abdominal tenderness. There is no rebound.   Musculoskeletal:         General: No swelling or tenderness.      Right lower leg: No edema.      Left lower leg: No edema.   Skin:     General: Skin is warm.      Coloration: Skin is not jaundiced.   Neurological:      General: No focal deficit present.      Mental Status: She is alert and oriented to person, place, and time.      Cranial Nerves: No cranial nerve deficit.      Gait: Gait normal.   Psychiatric:         Mood and Affect: Mood normal.       Assessment & Plan  Routine general medical examination at health care facility    Orders:  •  1 Year Follow Up In Primary Care - Wellness Exam; Future  •  Lipid Panel

## 2025-01-31 LAB
ALBUMIN SERPL-MCNC: 4.7 G/DL (ref 3.6–5.1)
ALP SERPL-CCNC: 80 U/L (ref 37–153)
ALT SERPL-CCNC: 25 U/L (ref 6–29)
ANION GAP SERPL CALCULATED.4IONS-SCNC: 9 MMOL/L (CALC) (ref 7–17)
AST SERPL-CCNC: 29 U/L (ref 10–35)
BASOPHILS # BLD AUTO: 36 CELLS/UL (ref 0–200)
BASOPHILS NFR BLD AUTO: 0.4 %
BILIRUB SERPL-MCNC: 0.4 MG/DL (ref 0.2–1.2)
BUN SERPL-MCNC: 12 MG/DL (ref 7–25)
CALCIUM SERPL-MCNC: 9.5 MG/DL (ref 8.6–10.4)
CHLORIDE SERPL-SCNC: 105 MMOL/L (ref 98–110)
CHOLEST SERPL-MCNC: 178 MG/DL
CHOLEST/HDLC SERPL: 3.3 (CALC)
CO2 SERPL-SCNC: 28 MMOL/L (ref 20–32)
CREAT SERPL-MCNC: 0.65 MG/DL (ref 0.5–1.05)
EGFRCR SERPLBLD CKD-EPI 2021: 96 ML/MIN/1.73M2
EOSINOPHIL # BLD AUTO: 231 CELLS/UL (ref 15–500)
EOSINOPHIL NFR BLD AUTO: 2.6 %
ERYTHROCYTE [DISTWIDTH] IN BLOOD BY AUTOMATED COUNT: 11.7 % (ref 11–15)
GLUCOSE SERPL-MCNC: 92 MG/DL (ref 65–99)
HCT VFR BLD AUTO: 47.5 % (ref 35–45)
HDLC SERPL-MCNC: 54 MG/DL
HGB BLD-MCNC: 16.1 G/DL (ref 11.7–15.5)
LDLC SERPL CALC-MCNC: 99 MG/DL (CALC)
LYMPHOCYTES # BLD AUTO: 2661 CELLS/UL (ref 850–3900)
LYMPHOCYTES NFR BLD AUTO: 29.9 %
MCH RBC QN AUTO: 31.5 PG (ref 27–33)
MCHC RBC AUTO-ENTMCNC: 33.9 G/DL (ref 32–36)
MCV RBC AUTO: 93 FL (ref 80–100)
MONOCYTES # BLD AUTO: 587 CELLS/UL (ref 200–950)
MONOCYTES NFR BLD AUTO: 6.6 %
NEUTROPHILS # BLD AUTO: 5385 CELLS/UL (ref 1500–7800)
NEUTROPHILS NFR BLD AUTO: 60.5 %
NONHDLC SERPL-MCNC: 124 MG/DL (CALC)
PLATELET # BLD AUTO: 241 THOUSAND/UL (ref 140–400)
PMV BLD REES-ECKER: 11.7 FL (ref 7.5–12.5)
POTASSIUM SERPL-SCNC: 4.4 MMOL/L (ref 3.5–5.3)
PROT SERPL-MCNC: 7 G/DL (ref 6.1–8.1)
RBC # BLD AUTO: 5.11 MILLION/UL (ref 3.8–5.1)
SODIUM SERPL-SCNC: 142 MMOL/L (ref 135–146)
TRIGL SERPL-MCNC: 156 MG/DL
WBC # BLD AUTO: 8.9 THOUSAND/UL (ref 3.8–10.8)

## 2025-02-10 ENCOUNTER — APPOINTMENT (OUTPATIENT)
Dept: DERMATOLOGY | Facility: CLINIC | Age: 70
End: 2025-02-10
Payer: MEDICARE

## 2025-02-10 DIAGNOSIS — Z12.83 SCREENING EXAM FOR SKIN CANCER: ICD-10-CM

## 2025-02-10 DIAGNOSIS — Z12.83 SKIN CANCER SCREENING: Primary | ICD-10-CM

## 2025-02-10 DIAGNOSIS — L70.0 ACNE VULGARIS: ICD-10-CM

## 2025-02-10 DIAGNOSIS — L81.4 LENTIGO: ICD-10-CM

## 2025-02-10 DIAGNOSIS — L82.1 SEBORRHEIC KERATOSIS: ICD-10-CM

## 2025-02-10 DIAGNOSIS — D18.01 HEMANGIOMA OF SKIN: ICD-10-CM

## 2025-02-10 PROCEDURE — 4004F PT TOBACCO SCREEN RCVD TLK: CPT | Performed by: NURSE PRACTITIONER

## 2025-02-10 PROCEDURE — 1160F RVW MEDS BY RX/DR IN RCRD: CPT | Performed by: NURSE PRACTITIONER

## 2025-02-10 PROCEDURE — 1159F MED LIST DOCD IN RCRD: CPT | Performed by: NURSE PRACTITIONER

## 2025-02-10 PROCEDURE — 99214 OFFICE O/P EST MOD 30 MIN: CPT | Performed by: NURSE PRACTITIONER

## 2025-02-10 RX ORDER — TRETINOIN 1 MG/G
CREAM TOPICAL
Qty: 45 G | Refills: 8 | Status: SHIPPED | OUTPATIENT
Start: 2025-02-10

## 2025-02-10 NOTE — PROGRESS NOTES
Subjective     Karissa Tillman is a 69 y.o. female who presents for the following: Skin Check (Pt presents to office for full skin exam.  Last full skin exam 11/2022 with Ledy Church CNP.  Pt has history of squamous cell carcinoma.  Pt has scattered lesions of concern at this time. Chaperone offered and declined.//).     Review of Systems:  No other skin or systemic complaints other than what is documented elsewhere in the note.    The following portions of the chart were reviewed this encounter and updated as appropriate:  Tobacco  Allergies  Meds  Problems  Med Hx  Surg Hx  Fam Hx         Skin Cancer History  No skin cancer on file.      Specialty Problems          Dermatology Problems    Seborrheic dermatitis, unspecified    Other melanin hyperpigmentation    Scar condition and fibrosis of skin    Rhinophyma    Actinic keratosis    Atypical nevus of face    Hemangioma of skin and subcutaneous tissue    Neoplasm of uncertain behavior of skin    Personal history of other malignant neoplasm of skin    Squamous cell carcinoma of skin of right upper limb, including shoulder    Xerosis cutis    Lipoma of flank        Objective   Well appearing patient in no apparent distress; mood and affect are within normal limits.    A full examination was performed including scalp, head, eyes, ears, nose, lips, neck, chest, axillae, abdomen, back, buttocks, bilateral upper extremities, bilateral lower extremities, hands, feet, fingers, toes, fingernails, and toenails. All findings within normal limits unless otherwise noted below.    Assessment/Plan   1. Skin cancer screening    Related Procedures  Follow Up In Dermatology - Established Patient    2. Screening exam for skin cancer  Scattered benign lesions. Scar(s) clear no sign of recurrence.     No evidence of recurrence in scar and benign ROS.   Continue with regular total body skin exams.  ABCDEs of melanoma and atypical moles were discussed with the  patient.  Patient was instructed to perform monthly self skin examination.  We recommended that the patient have regular full skin exams given an increased risk of subsequent skin cancers.  The patient was instructed to use sun protective behaviors including use of broad spectrum sunscreens and sun protective clothing to reduce risk of skin cancers.    3. Seborrheic keratosis  Stuck on verrucous, tan-brown papules and plaques.      Although Seborrheic Keratoses can be troublesome and unsightly, they are entirely benign.  Removal of Seborrheic Keratoses is considered a cosmetic procedure. Removal is typically performed using liquid nitrogen cryotherapy.  Treatment of current lesions does not prevent the development of new Seborrheic Keratoses in the future.    4. Hemangioma of skin  Violaceous/red papule with maroon lagoons     - A cherry hemangioma is a small macule (small, flat, smooth area) or papule (small, solid bump) formed from an overgrowth of tiny blood vessels in the skin. Cherry hemangiomas are characteristically red or purplish in color. They often first appear in middle adulthood and usually increase in number with age. Cherry hemangiomas are noncancerous (benign) and are common in adults.  - Lesions are benign, reassured patient.     5. Lentigo  Scattered tan macules in sun-exposed areas.    A solar lentigo (plural, solar lentigines), sometimes called an age spot or liver spot, is a brown macule (small, flat, smooth area of skin) caused by chronic sun or artificial ultraviolet (UV) light exposure. There may be just one lentigo or there may be multiple. This type of lentigo is different from lentigo simplex (discussed separately) because it is caused by exposure to UV light. Solar lentigines are benign, but they do indicate excessive sun exposure, a risk factor for the development of skin cancer.  Lesions are benign, no treatment needed.     6. Acne vulgaris  Head - Anterior (Face)  Scattered closed  nilsa.     -Discussed the nature of the condition  -Discussed treatment options  -Recommend to begin topical retinoids; if just starting therapy with retinoid, start application of a very thin layer three nights per week as tolerated. If extreme redness or dryness occurs, hold medication until resolved and then restart at a greater interval. May apply moisturizer after application of retinoid. Advance toward nightly use as tolerated  -Recommend: Start tretinoin cream, use as directed.   - Risks, benefits, and side effects discussed. Patient understood and agrees with the plan.       Related Medications  tretinoin (Retin-A) 0.1 % cream  Pea-size amount to entire face at bedtime. Decrease use if too drying.      Follow up in 12 months for a total body skin exam. Please call me if there are any changes or development of concerning symptoms (lesion/skin condition is changing, bleeding, enlarging, or worsening).

## 2025-04-12 ENCOUNTER — CLINICAL SUPPORT (OUTPATIENT)
Dept: URGENT CARE | Facility: URGENT CARE | Age: 70
End: 2025-04-12
Payer: MEDICARE

## 2025-04-12 VITALS
SYSTOLIC BLOOD PRESSURE: 145 MMHG | OXYGEN SATURATION: 96 % | TEMPERATURE: 98.1 F | DIASTOLIC BLOOD PRESSURE: 91 MMHG | HEART RATE: 99 BPM | RESPIRATION RATE: 22 BRPM

## 2025-04-12 DIAGNOSIS — R05.1 ACUTE COUGH: Primary | ICD-10-CM

## 2025-04-12 DIAGNOSIS — J44.1 ACUTE EXACERBATION OF CHRONIC OBSTRUCTIVE BRONCHITIS (MULTI): ICD-10-CM

## 2025-04-12 DIAGNOSIS — J20.6 ACUTE BRONCHITIS DUE TO RHINOVIRUS: ICD-10-CM

## 2025-04-12 DIAGNOSIS — J06.9 VIRAL URI: ICD-10-CM

## 2025-04-12 LAB
POC CORONAVIRUS SARS-COV-2 PCR: NEGATIVE
POC HUMAN RHINOVIRUS PCR: POSITIVE
POC INFLUENZA A VIRUS PCR: NEGATIVE
POC INFLUENZA B VIRUS PCR: NEGATIVE
POC RESPIRATORY SYNCYTIAL VIRUS PCR: NEGATIVE

## 2025-04-12 PROCEDURE — 1160F RVW MEDS BY RX/DR IN RCRD: CPT | Performed by: FAMILY MEDICINE

## 2025-04-12 PROCEDURE — 87631 RESP VIRUS 3-5 TARGETS: CPT | Performed by: FAMILY MEDICINE

## 2025-04-12 PROCEDURE — 99204 OFFICE O/P NEW MOD 45 MIN: CPT | Performed by: FAMILY MEDICINE

## 2025-04-12 PROCEDURE — 1159F MED LIST DOCD IN RCRD: CPT | Performed by: FAMILY MEDICINE

## 2025-04-12 RX ORDER — PREDNISONE 20 MG/1
40 TABLET ORAL DAILY
Qty: 10 TABLET | Refills: 0 | Status: SHIPPED | OUTPATIENT
Start: 2025-04-12 | End: 2025-04-17

## 2025-04-12 RX ORDER — BENZONATATE 200 MG/1
200 CAPSULE ORAL 3 TIMES DAILY PRN
Qty: 30 CAPSULE | Refills: 0 | Status: SHIPPED | OUTPATIENT
Start: 2025-04-12 | End: 2025-04-19

## 2025-04-12 RX ORDER — ALBUTEROL SULFATE 90 UG/1
INHALANT RESPIRATORY (INHALATION)
Qty: 6.7 G | Refills: 0 | Status: SHIPPED | OUTPATIENT
Start: 2025-04-12

## 2025-04-12 RX ORDER — INHALER, ASSIST DEVICES
SPACER (EA) MISCELLANEOUS
Qty: 1 EACH | Refills: 0 | Status: SHIPPED | OUTPATIENT
Start: 2025-04-12

## 2025-04-12 ASSESSMENT — ENCOUNTER SYMPTOMS
DYSURIA: 0
ABDOMINAL PAIN: 0
HEADACHES: 1
CHILLS: 0
NAUSEA: 0
FEVER: 0
CHEST TIGHTNESS: 1
VOMITING: 0
SORE THROAT: 1
DIARRHEA: 0
RHINORRHEA: 1
PALPITATIONS: 0
SINUS PRESSURE: 1
WHEEZING: 1
FREQUENCY: 0
COUGH: 1
CONSTIPATION: 0
SHORTNESS OF BREATH: 1

## 2025-04-12 NOTE — PROGRESS NOTES
Subjective   Patient ID: Karissa Tillman is a 69 y.o. female.      History provided by:  Patient   used: No    Cough  Associated symptoms include headaches, rhinorrhea, a sore throat (slight), shortness of breath and wheezing. Pertinent negatives include no chills, ear pain or fever.       The following portions of the chart were reviewed this encounter and updated as appropriate:  Tobacco  Allergies  Meds  Problems  Med Hx  Surg Hx  Fam Hx         Review of Systems   Constitutional:  Negative for chills and fever.   HENT:  Positive for congestion, rhinorrhea, sinus pressure and sore throat (slight). Negative for ear pain.    Respiratory:  Positive for cough, chest tightness, shortness of breath and wheezing.    Cardiovascular:  Negative for palpitations.   Gastrointestinal:  Negative for abdominal pain, constipation, diarrhea, nausea and vomiting.   Genitourinary:  Negative for dysuria and frequency.   Neurological:  Positive for headaches.     Objective   Physical Exam  Procedures    Assessment/Plan   {Assess/PlanSmartLinks:39131}    Patient disposition: { Disposition:15546}   turbinates are reddened and moderately swollen in appearance  There is thick yellow nasal discharge in both nares    Oral mucosa is uniformly pink and moist  Palate is pink, symmetric and intact  Tongue is moist, mobile and midline  Posterior pharynx moderately erythematous with no concretions or exudates present  No cervical lymphadenopathy palpated    Heart has regular rate and rhythm. No murmurs, rubs or gallops are auscultated at this exam.    Respiratory rate rhythm and effort are normal. Breath sounds bilaterally are  coarse on auscultation with dry crackles.  No rhonchi or friction rub.  Tight expiratory wheezes all lung fields    Abdomen: Normal bowel sounds on auscultation. Soft, nontender without rebound or rigidity on palpation     POCT: Covid 19-negative, influenza A-negative, influenza B-negative, RSV-negative, human rhinovirus-positive  Assessment/Plan   Diagnoses and all orders for this visit:  Acute cough  -     POCT SPOTFIRE R/ST Panel Mini w/COVID ([facility]) manually resulted  -     benzonatate (Tessalon) 200 mg capsule; Take 1 capsule (200 mg) by mouth 3 times a day as needed for cough for up to 7 days. Do not crush or chew.  Acute exacerbation of chronic obstructive bronchitis (Multi)  -     albuterol (Proventil HFA) 90 mcg/actuation inhaler; Take 1 puff via spacer,  take 5-6 easy breaths.  Rest 2 minutes.  Repeat x1.  May use every 4-6 hours as needed  -     inhalational spacing device (Aerochamber MV) inhaler; Use as instructed  -     predniSONE (Deltasone) 20 mg tablet; Take 2 tablets (40 mg) by mouth once daily for 5 days.  Viral URI    Patient disposition: Home

## 2025-04-12 NOTE — PATIENT INSTRUCTIONS
You have a viral upper respiratory infection with cough and acute exacerbation of chronic bronchitis  Please take prednisone as a single dose early in the day with food as prescribed  Use inhaler with spacer device 2 puffs every 4-6 hours for the next 7-10 days, decrease frequency of use as symptoms improve. Please take 5-6 deep breaths after activating inhaler from spacer chamber. Pause for approximately 1-2 minutes.  Repeat activation of inhaler and 5-6 deep breaths from spacer.  Increase oral fluids for the next 7-10 days  After you have completed the prednisone, you may use ibuprofen 200mg, 2 tabs by mouth every 8 hours with food as needed for discomfort or fever.  May take Tylenol 325 mg 2 tablets by mouth every 4-6 hours as needed for discomfort or fever  If no improvement in 5-7 days follow-up with primary care provider  If fever greater than 102 degrees Fahrenheit, chills, nausea, vomiting,  increased difficulty breathing, difficulty swallowing,  increased shortness of breath, worsening wheezing go to emergency department for further evaluation  This note was generated by voice recognition software. Minor transcription/grammatical errors may be present. Please call for clarification.

## 2025-04-14 ENCOUNTER — TELEPHONE (OUTPATIENT)
Dept: PRIMARY CARE | Facility: CLINIC | Age: 70
End: 2025-04-14
Payer: MEDICARE

## 2025-04-14 NOTE — TELEPHONE ENCOUNTER
Pt called said she went to  on Sat they gave her steroid and an inhaler and she is having a hard time breathing.  I told her that she would need to go to the ER for evaluation.

## 2025-07-22 ENCOUNTER — APPOINTMENT (OUTPATIENT)
Dept: PRIMARY CARE | Facility: CLINIC | Age: 70
End: 2025-07-22
Payer: MEDICARE

## 2025-08-18 ENCOUNTER — APPOINTMENT (OUTPATIENT)
Dept: PRIMARY CARE | Facility: CLINIC | Age: 70
End: 2025-08-18
Payer: MEDICARE

## 2025-08-18 VITALS
HEART RATE: 87 BPM | OXYGEN SATURATION: 95 % | DIASTOLIC BLOOD PRESSURE: 88 MMHG | SYSTOLIC BLOOD PRESSURE: 136 MMHG | BODY MASS INDEX: 23.95 KG/M2 | TEMPERATURE: 98.1 F | WEIGHT: 152.6 LBS | HEIGHT: 67 IN

## 2025-08-18 DIAGNOSIS — G89.29 CHRONIC PAIN OF RIGHT KNEE: ICD-10-CM

## 2025-08-18 DIAGNOSIS — Z00.00 ROUTINE GENERAL MEDICAL EXAMINATION AT HEALTH CARE FACILITY: Primary | ICD-10-CM

## 2025-08-18 DIAGNOSIS — M25.561 CHRONIC PAIN OF RIGHT KNEE: ICD-10-CM

## 2025-08-18 DIAGNOSIS — Z12.31 BREAST CANCER SCREENING BY MAMMOGRAM: ICD-10-CM

## 2025-08-18 PROCEDURE — G0439 PPPS, SUBSEQ VISIT: HCPCS | Performed by: FAMILY MEDICINE

## 2025-08-18 PROCEDURE — 1170F FXNL STATUS ASSESSED: CPT | Performed by: FAMILY MEDICINE

## 2025-08-18 PROCEDURE — 3008F BODY MASS INDEX DOCD: CPT | Performed by: FAMILY MEDICINE

## 2025-08-18 PROCEDURE — 1159F MED LIST DOCD IN RCRD: CPT | Performed by: FAMILY MEDICINE

## 2025-08-18 PROCEDURE — 1125F AMNT PAIN NOTED PAIN PRSNT: CPT | Performed by: FAMILY MEDICINE

## 2025-08-18 RX ORDER — OLOPATADINE HYDROCHLORIDE 2 MG/ML
1 SOLUTION OPHTHALMIC DAILY
Qty: 5 ML | Refills: 3 | Status: SHIPPED | OUTPATIENT
Start: 2025-08-18 | End: 2026-08-18

## 2025-08-18 ASSESSMENT — LIFESTYLE VARIABLES: HOW OFTEN DO YOU HAVE A DRINK CONTAINING ALCOHOL: NEVER

## 2025-08-18 ASSESSMENT — ENCOUNTER SYMPTOMS
SHORTNESS OF BREATH: 0
NERVOUS/ANXIOUS: 0
VOMITING: 0
DYSPHORIC MOOD: 0
COUGH: 0
CONSTIPATION: 0
FEVER: 0
NAUSEA: 0
BRUISES/BLEEDS EASILY: 0
DIZZINESS: 0
WHEEZING: 0
ARTHRALGIAS: 1
HEADACHES: 0
DIARRHEA: 0
FREQUENCY: 0
SORE THROAT: 0
HEMATURIA: 0
DYSURIA: 0
CHILLS: 0
PALPITATIONS: 0
ABDOMINAL PAIN: 0
JOINT SWELLING: 0
FATIGUE: 0

## 2025-08-18 ASSESSMENT — ACTIVITIES OF DAILY LIVING (ADL)
MANAGING_FINANCES: INDEPENDENT
DRESSING: INDEPENDENT
BATHING: INDEPENDENT
TAKING_MEDICATION: INDEPENDENT
GROCERY_SHOPPING: INDEPENDENT
DOING_HOUSEWORK: INDEPENDENT

## 2025-08-18 ASSESSMENT — PAIN SCALES - GENERAL: PAINLEVEL_OUTOF10: 4

## 2025-08-18 ASSESSMENT — PATIENT HEALTH QUESTIONNAIRE - PHQ9
SUM OF ALL RESPONSES TO PHQ9 QUESTIONS 1 AND 2: 0
1. LITTLE INTEREST OR PLEASURE IN DOING THINGS: NOT AT ALL
2. FEELING DOWN, DEPRESSED OR HOPELESS: NOT AT ALL

## 2025-08-19 ENCOUNTER — APPOINTMENT (OUTPATIENT)
Dept: PRIMARY CARE | Facility: CLINIC | Age: 70
End: 2025-08-19
Payer: MEDICARE

## 2025-08-28 ENCOUNTER — HOSPITAL ENCOUNTER (OUTPATIENT)
Dept: RADIOLOGY | Facility: HOSPITAL | Age: 70
Discharge: HOME | End: 2025-08-28
Payer: MEDICARE

## 2025-08-28 VITALS — BODY MASS INDEX: 23.86 KG/M2 | HEIGHT: 67 IN | WEIGHT: 152 LBS

## 2025-08-28 DIAGNOSIS — G89.29 CHRONIC PAIN OF RIGHT KNEE: ICD-10-CM

## 2025-08-28 DIAGNOSIS — M25.561 CHRONIC PAIN OF RIGHT KNEE: ICD-10-CM

## 2025-08-28 DIAGNOSIS — Z12.31 BREAST CANCER SCREENING BY MAMMOGRAM: ICD-10-CM

## 2025-08-28 PROCEDURE — 77063 BREAST TOMOSYNTHESIS BI: CPT | Performed by: RADIOLOGY

## 2025-08-28 PROCEDURE — 77067 SCR MAMMO BI INCL CAD: CPT

## 2025-08-28 PROCEDURE — 73562 X-RAY EXAM OF KNEE 3: CPT | Mod: RT

## 2025-08-28 PROCEDURE — 77067 SCR MAMMO BI INCL CAD: CPT | Performed by: RADIOLOGY

## 2025-08-28 PROCEDURE — 73562 X-RAY EXAM OF KNEE 3: CPT | Mod: RIGHT SIDE | Performed by: RADIOLOGY

## 2026-02-17 ENCOUNTER — APPOINTMENT (OUTPATIENT)
Dept: PRIMARY CARE | Facility: CLINIC | Age: 71
End: 2026-02-17
Payer: MEDICARE

## 2026-03-09 ENCOUNTER — APPOINTMENT (OUTPATIENT)
Dept: DERMATOLOGY | Facility: CLINIC | Age: 71
End: 2026-03-09
Payer: MEDICARE